# Patient Record
Sex: MALE | Race: WHITE | NOT HISPANIC OR LATINO | Employment: OTHER | URBAN - METROPOLITAN AREA
[De-identification: names, ages, dates, MRNs, and addresses within clinical notes are randomized per-mention and may not be internally consistent; named-entity substitution may affect disease eponyms.]

---

## 2018-02-26 ENCOUNTER — NEW REFERRAL (OUTPATIENT)
Dept: URBAN - METROPOLITAN AREA CLINIC 26 | Facility: CLINIC | Age: 73
End: 2018-02-26

## 2018-02-26 VITALS
DIASTOLIC BLOOD PRESSURE: 96 MMHG | HEIGHT: 69 IN | HEART RATE: 67 BPM | SYSTOLIC BLOOD PRESSURE: 158 MMHG | BODY MASS INDEX: 26.66 KG/M2 | WEIGHT: 180 LBS

## 2018-02-26 DIAGNOSIS — H33.302: ICD-10-CM

## 2018-02-26 DIAGNOSIS — H35.3122: ICD-10-CM

## 2018-02-26 DIAGNOSIS — H02.833: ICD-10-CM

## 2018-02-26 DIAGNOSIS — H43.391: ICD-10-CM

## 2018-02-26 DIAGNOSIS — H33.001: ICD-10-CM

## 2018-02-26 DIAGNOSIS — H04.123: ICD-10-CM

## 2018-02-26 DIAGNOSIS — H02.836: ICD-10-CM

## 2018-02-26 DIAGNOSIS — H43.811: ICD-10-CM

## 2018-02-26 PROCEDURE — 67110 REPAIR DETACHED RETINA: CPT

## 2018-02-26 PROCEDURE — 76512 OPH US DX B-SCAN: CPT

## 2018-02-26 PROCEDURE — 92225 OPHTHALMOSCOPY (INITIAL): CPT

## 2018-02-26 PROCEDURE — 92250 FUNDUS PHOTOGRAPHY W/I&R: CPT

## 2018-02-26 PROCEDURE — 99204 OFFICE O/P NEW MOD 45 MIN: CPT

## 2018-02-26 ASSESSMENT — VISUAL ACUITY: OS_SC: 20/30-

## 2018-02-26 ASSESSMENT — TONOMETRY
OS_IOP_MMHG: 10
OD_IOP_MMHG: 10

## 2018-02-27 ENCOUNTER — POST-OP CHECK (OUTPATIENT)
Dept: URBAN - METROPOLITAN AREA CLINIC 26 | Facility: CLINIC | Age: 73
End: 2018-02-27

## 2018-02-27 DIAGNOSIS — H02.833: ICD-10-CM

## 2018-02-27 DIAGNOSIS — H02.836: ICD-10-CM

## 2018-02-27 DIAGNOSIS — H33.302: ICD-10-CM

## 2018-02-27 DIAGNOSIS — H33.001: ICD-10-CM

## 2018-02-27 DIAGNOSIS — H35.3122: ICD-10-CM

## 2018-02-27 DIAGNOSIS — H43.391: ICD-10-CM

## 2018-02-27 DIAGNOSIS — Z98.890: ICD-10-CM

## 2018-02-27 DIAGNOSIS — H43.811: ICD-10-CM

## 2018-02-27 DIAGNOSIS — H04.123: ICD-10-CM

## 2018-02-27 PROCEDURE — 99024 POSTOP FOLLOW-UP VISIT: CPT

## 2018-02-27 PROCEDURE — 67145 PROPH RTA DTCHMNT PC: CPT

## 2018-02-27 ASSESSMENT — VISUAL ACUITY
OD_SC: 20/200
OS_SC: 20/30-2

## 2018-02-27 ASSESSMENT — TONOMETRY
OS_IOP_MMHG: 10
OD_IOP_MMHG: 06

## 2018-03-05 ENCOUNTER — POST-OP CHECK (OUTPATIENT)
Dept: URBAN - METROPOLITAN AREA CLINIC 26 | Facility: CLINIC | Age: 73
End: 2018-03-05

## 2018-03-05 DIAGNOSIS — H33.302: ICD-10-CM

## 2018-03-05 DIAGNOSIS — H02.833: ICD-10-CM

## 2018-03-05 DIAGNOSIS — Z98.890: ICD-10-CM

## 2018-03-05 DIAGNOSIS — H04.123: ICD-10-CM

## 2018-03-05 DIAGNOSIS — H33.001: ICD-10-CM

## 2018-03-05 DIAGNOSIS — H02.836: ICD-10-CM

## 2018-03-05 DIAGNOSIS — H43.391: ICD-10-CM

## 2018-03-05 DIAGNOSIS — H43.811: ICD-10-CM

## 2018-03-05 DIAGNOSIS — H35.3122: ICD-10-CM

## 2018-03-05 PROCEDURE — 99024 POSTOP FOLLOW-UP VISIT: CPT

## 2018-03-05 PROCEDURE — 67105 REPAIR DETACHED RETINA PC: CPT

## 2018-03-05 ASSESSMENT — TONOMETRY: OD_IOP_MMHG: 13

## 2018-03-05 ASSESSMENT — VISUAL ACUITY
OD_CC: 20/50-2
OD_SC: 20/80+1

## 2018-04-02 ENCOUNTER — POST OP-DILATION (OUTPATIENT)
Dept: URBAN - METROPOLITAN AREA CLINIC 26 | Facility: CLINIC | Age: 73
End: 2018-04-02

## 2018-04-02 DIAGNOSIS — H35.3211: ICD-10-CM

## 2018-04-02 DIAGNOSIS — H33.001: ICD-10-CM

## 2018-04-02 DIAGNOSIS — Z98.890: ICD-10-CM

## 2018-04-02 DIAGNOSIS — H43.811: ICD-10-CM

## 2018-04-02 DIAGNOSIS — H35.3132: ICD-10-CM

## 2018-04-02 DIAGNOSIS — H33.302: ICD-10-CM

## 2018-04-02 DIAGNOSIS — H35.373: ICD-10-CM

## 2018-04-02 PROCEDURE — 92250 FUNDUS PHOTOGRAPHY W/I&R: CPT

## 2018-04-02 PROCEDURE — 92134 CPTRZ OPH DX IMG PST SGM RTA: CPT

## 2018-04-02 PROCEDURE — 99024 POSTOP FOLLOW-UP VISIT: CPT

## 2018-04-02 PROCEDURE — 92235 FLUORESCEIN ANGRPH MLTIFRAME: CPT

## 2018-04-02 PROCEDURE — 67028 INJECTION EYE DRUG: CPT

## 2018-04-02 ASSESSMENT — TONOMETRY
OD_IOP_MMHG: 9
OS_IOP_MMHG: 10

## 2018-04-02 ASSESSMENT — VISUAL ACUITY
OD_CC: 20/20-1
OD_SC: 20/25+2
OS_CC: 20/30+1
OS_SC: 20/40+1

## 2018-04-10 ENCOUNTER — POST OP-DILATION (OUTPATIENT)
Dept: URBAN - METROPOLITAN AREA CLINIC 26 | Facility: CLINIC | Age: 73
End: 2018-04-10

## 2018-04-10 DIAGNOSIS — H33.001: ICD-10-CM

## 2018-04-10 PROCEDURE — 67105 REPAIR DETACHED RETINA PC: CPT

## 2018-04-10 ASSESSMENT — VISUAL ACUITY
OD_SC: 20/20
OD_CC: 20/15-2

## 2018-04-10 ASSESSMENT — TONOMETRY: OD_IOP_MMHG: 10

## 2018-05-08 ENCOUNTER — CLINICAL PROCEDURE AND DIAGNOSTIC TESTING ONLY (OUTPATIENT)
Dept: URBAN - METROPOLITAN AREA CLINIC 26 | Facility: CLINIC | Age: 73
End: 2018-05-08

## 2018-05-08 DIAGNOSIS — H35.3211: ICD-10-CM

## 2018-05-08 DIAGNOSIS — H35.3132: ICD-10-CM

## 2018-05-08 PROCEDURE — 92134 CPTRZ OPH DX IMG PST SGM RTA: CPT

## 2018-05-08 PROCEDURE — 67028 INJECTION EYE DRUG: CPT

## 2018-05-08 ASSESSMENT — VISUAL ACUITY
OD_CC: 20/15-1
OD_SC: 20/20-

## 2018-05-08 ASSESSMENT — TONOMETRY: OD_IOP_MMHG: 12

## 2018-06-12 ENCOUNTER — FOLLOW UP AND POST INJECTION EVALUATION (OUTPATIENT)
Dept: URBAN - METROPOLITAN AREA CLINIC 26 | Facility: CLINIC | Age: 73
End: 2018-06-12

## 2018-06-12 VITALS — HEART RATE: 77 BPM | HEIGHT: 60 IN | DIASTOLIC BLOOD PRESSURE: 86 MMHG | SYSTOLIC BLOOD PRESSURE: 146 MMHG

## 2018-06-12 DIAGNOSIS — H02.412: ICD-10-CM

## 2018-06-12 DIAGNOSIS — H33.001: ICD-10-CM

## 2018-06-12 DIAGNOSIS — H04.123: ICD-10-CM

## 2018-06-12 DIAGNOSIS — H35.3132: ICD-10-CM

## 2018-06-12 DIAGNOSIS — H35.3211: ICD-10-CM

## 2018-06-12 DIAGNOSIS — H33.302: ICD-10-CM

## 2018-06-12 DIAGNOSIS — Z98.890: ICD-10-CM

## 2018-06-12 DIAGNOSIS — H43.811: ICD-10-CM

## 2018-06-12 DIAGNOSIS — H35.373: ICD-10-CM

## 2018-06-12 DIAGNOSIS — H43.391: ICD-10-CM

## 2018-06-12 PROCEDURE — 67028 INJECTION EYE DRUG: CPT

## 2018-06-12 PROCEDURE — 92014 COMPRE OPH EXAM EST PT 1/>: CPT

## 2018-06-12 PROCEDURE — 92250 FUNDUS PHOTOGRAPHY W/I&R: CPT

## 2018-06-12 ASSESSMENT — TONOMETRY
OS_IOP_MMHG: 15
OD_IOP_MMHG: 14

## 2018-06-12 ASSESSMENT — VISUAL ACUITY
OD_CC: 20/20+2
OS_CC: 20/25+1

## 2018-07-24 ENCOUNTER — CLINICAL PROCEDURE AND DIAGNOSTIC TESTING ONLY (OUTPATIENT)
Dept: URBAN - METROPOLITAN AREA CLINIC 26 | Facility: CLINIC | Age: 73
End: 2018-07-24

## 2018-07-24 DIAGNOSIS — H35.3211: ICD-10-CM

## 2018-07-24 DIAGNOSIS — H35.3122: ICD-10-CM

## 2018-07-24 DIAGNOSIS — H33.302: ICD-10-CM

## 2018-07-24 DIAGNOSIS — H35.373: ICD-10-CM

## 2018-07-24 PROCEDURE — 92250 FUNDUS PHOTOGRAPHY W/I&R: CPT

## 2018-07-24 PROCEDURE — 67028 INJECTION EYE DRUG: CPT

## 2018-07-24 PROCEDURE — 92134 CPTRZ OPH DX IMG PST SGM RTA: CPT

## 2018-07-24 ASSESSMENT — TONOMETRY: OD_IOP_MMHG: 14

## 2018-07-24 ASSESSMENT — VISUAL ACUITY: OD_CC: 20/20-1

## 2018-09-06 ENCOUNTER — CLINICAL PROCEDURE AND DIAGNOSTIC TESTING ONLY (OUTPATIENT)
Dept: URBAN - METROPOLITAN AREA CLINIC 26 | Facility: CLINIC | Age: 73
End: 2018-09-06

## 2018-09-06 DIAGNOSIS — H35.3211: ICD-10-CM

## 2018-09-06 DIAGNOSIS — H35.3122: ICD-10-CM

## 2018-09-06 DIAGNOSIS — H35.373: ICD-10-CM

## 2018-09-06 DIAGNOSIS — H33.302: ICD-10-CM

## 2018-09-06 PROCEDURE — 67028 INJECTION EYE DRUG: CPT

## 2018-09-06 PROCEDURE — 92134 CPTRZ OPH DX IMG PST SGM RTA: CPT

## 2018-09-06 PROCEDURE — 92250 FUNDUS PHOTOGRAPHY W/I&R: CPT

## 2018-09-06 ASSESSMENT — TONOMETRY: OD_IOP_MMHG: 12

## 2018-09-06 ASSESSMENT — VISUAL ACUITY: OD_CC: 20/15-1

## 2018-10-18 ENCOUNTER — FOLLOW UP AND POST INJECTION EVALUATION (OUTPATIENT)
Dept: URBAN - METROPOLITAN AREA CLINIC 26 | Facility: CLINIC | Age: 73
End: 2018-10-18

## 2018-10-18 DIAGNOSIS — H43.811: ICD-10-CM

## 2018-10-18 DIAGNOSIS — H35.3122: ICD-10-CM

## 2018-10-18 DIAGNOSIS — H35.3211: ICD-10-CM

## 2018-10-18 DIAGNOSIS — H33.302: ICD-10-CM

## 2018-10-18 DIAGNOSIS — H35.373: ICD-10-CM

## 2018-10-18 DIAGNOSIS — H43.391: ICD-10-CM

## 2018-10-18 DIAGNOSIS — H33.001: ICD-10-CM

## 2018-10-18 PROCEDURE — 92014 COMPRE OPH EXAM EST PT 1/>: CPT

## 2018-10-18 PROCEDURE — 92134 CPTRZ OPH DX IMG PST SGM RTA: CPT

## 2018-10-18 PROCEDURE — 92250 FUNDUS PHOTOGRAPHY W/I&R: CPT

## 2018-10-18 PROCEDURE — 67028 INJECTION EYE DRUG: CPT

## 2018-10-18 ASSESSMENT — TONOMETRY
OD_IOP_MMHG: 15
OS_IOP_MMHG: 14

## 2018-10-18 ASSESSMENT — VISUAL ACUITY
OD_CC: 20/20-1
OS_CC: 20/25+2

## 2018-12-06 ENCOUNTER — CLINICAL PROCEDURE AND DIAGNOSTIC TESTING ONLY (OUTPATIENT)
Dept: URBAN - METROPOLITAN AREA CLINIC 26 | Facility: CLINIC | Age: 73
End: 2018-12-06

## 2018-12-06 DIAGNOSIS — H33.302: ICD-10-CM

## 2018-12-06 DIAGNOSIS — H35.3211: ICD-10-CM

## 2018-12-06 DIAGNOSIS — H35.3122: ICD-10-CM

## 2018-12-06 PROCEDURE — 92250 FUNDUS PHOTOGRAPHY W/I&R: CPT

## 2018-12-06 PROCEDURE — 67028 INJECTION EYE DRUG: CPT

## 2018-12-06 PROCEDURE — 92134 CPTRZ OPH DX IMG PST SGM RTA: CPT

## 2018-12-06 ASSESSMENT — VISUAL ACUITY: OD_CC: 20/20-2

## 2018-12-06 ASSESSMENT — TONOMETRY: OD_IOP_MMHG: 10

## 2019-02-06 ENCOUNTER — FOLLOW UP AND POST INJECTION EVALUATION (OUTPATIENT)
Dept: URBAN - METROPOLITAN AREA CLINIC 26 | Facility: CLINIC | Age: 74
End: 2019-02-06

## 2019-02-06 VITALS — HEIGHT: 69 IN | WEIGHT: 177 LBS | BODY MASS INDEX: 26.22 KG/M2

## 2019-02-06 DIAGNOSIS — Z98.890: ICD-10-CM

## 2019-02-06 DIAGNOSIS — H35.3122: ICD-10-CM

## 2019-02-06 DIAGNOSIS — H02.412: ICD-10-CM

## 2019-02-06 DIAGNOSIS — H33.302: ICD-10-CM

## 2019-02-06 DIAGNOSIS — H43.391: ICD-10-CM

## 2019-02-06 DIAGNOSIS — H43.811: ICD-10-CM

## 2019-02-06 DIAGNOSIS — H04.123: ICD-10-CM

## 2019-02-06 DIAGNOSIS — H02.833: ICD-10-CM

## 2019-02-06 DIAGNOSIS — H35.3211: ICD-10-CM

## 2019-02-06 DIAGNOSIS — H35.373: ICD-10-CM

## 2019-02-06 DIAGNOSIS — H02.836: ICD-10-CM

## 2019-02-06 DIAGNOSIS — H33.001: ICD-10-CM

## 2019-02-06 PROCEDURE — 92014 COMPRE OPH EXAM EST PT 1/>: CPT

## 2019-02-06 PROCEDURE — 92250 FUNDUS PHOTOGRAPHY W/I&R: CPT

## 2019-02-06 PROCEDURE — 92134 CPTRZ OPH DX IMG PST SGM RTA: CPT

## 2019-02-06 PROCEDURE — 67028 INJECTION EYE DRUG: CPT

## 2019-02-06 ASSESSMENT — VISUAL ACUITY
OD_CC: 20/15-1
OS_CC: 20/25

## 2019-02-06 ASSESSMENT — TONOMETRY
OS_IOP_MMHG: 13
OD_IOP_MMHG: 12

## 2019-04-17 ENCOUNTER — CLINICAL PROCEDURE AND DIAGNOSTIC TESTING ONLY (OUTPATIENT)
Dept: URBAN - METROPOLITAN AREA CLINIC 26 | Facility: CLINIC | Age: 74
End: 2019-04-17

## 2019-04-17 DIAGNOSIS — H35.3122: ICD-10-CM

## 2019-04-17 DIAGNOSIS — H33.302: ICD-10-CM

## 2019-04-17 DIAGNOSIS — H35.3211: ICD-10-CM

## 2019-04-17 PROCEDURE — 92250 FUNDUS PHOTOGRAPHY W/I&R: CPT

## 2019-04-17 PROCEDURE — 92134 CPTRZ OPH DX IMG PST SGM RTA: CPT

## 2019-04-17 PROCEDURE — 67028 INJECTION EYE DRUG: CPT

## 2019-04-17 ASSESSMENT — VISUAL ACUITY: OD_CC: 20/15-2

## 2019-04-17 ASSESSMENT — TONOMETRY: OD_IOP_MMHG: 14

## 2019-06-27 ENCOUNTER — CLINICAL PROCEDURE AND DIAGNOSTIC TESTING ONLY (OUTPATIENT)
Dept: URBAN - METROPOLITAN AREA CLINIC 26 | Facility: CLINIC | Age: 74
End: 2019-06-27

## 2019-06-27 DIAGNOSIS — H35.373: ICD-10-CM

## 2019-06-27 DIAGNOSIS — H35.3122: ICD-10-CM

## 2019-06-27 DIAGNOSIS — H33.302: ICD-10-CM

## 2019-06-27 DIAGNOSIS — H35.3211: ICD-10-CM

## 2019-06-27 PROCEDURE — 92250 FUNDUS PHOTOGRAPHY W/I&R: CPT

## 2019-06-27 PROCEDURE — 92134 CPTRZ OPH DX IMG PST SGM RTA: CPT

## 2019-06-27 PROCEDURE — 67028 INJECTION EYE DRUG: CPT

## 2019-06-27 ASSESSMENT — TONOMETRY: OD_IOP_MMHG: 10

## 2019-06-27 ASSESSMENT — VISUAL ACUITY: OD_SC: 20/20+2

## 2019-08-29 ENCOUNTER — FOLLOW UP AND POST INJECTION EVALUATION (OUTPATIENT)
Dept: URBAN - METROPOLITAN AREA CLINIC 26 | Facility: CLINIC | Age: 74
End: 2019-08-29

## 2019-08-29 DIAGNOSIS — H35.373: ICD-10-CM

## 2019-08-29 DIAGNOSIS — H33.001: ICD-10-CM

## 2019-08-29 DIAGNOSIS — H35.3211: ICD-10-CM

## 2019-08-29 DIAGNOSIS — H35.3122: ICD-10-CM

## 2019-08-29 DIAGNOSIS — H43.811: ICD-10-CM

## 2019-08-29 DIAGNOSIS — H43.391: ICD-10-CM

## 2019-08-29 DIAGNOSIS — H33.302: ICD-10-CM

## 2019-08-29 PROCEDURE — 92134 CPTRZ OPH DX IMG PST SGM RTA: CPT

## 2019-08-29 PROCEDURE — 92014 COMPRE OPH EXAM EST PT 1/>: CPT

## 2019-08-29 PROCEDURE — 92250 FUNDUS PHOTOGRAPHY W/I&R: CPT

## 2019-08-29 PROCEDURE — 67028 INJECTION EYE DRUG: CPT

## 2019-08-29 ASSESSMENT — TONOMETRY
OD_IOP_MMHG: 10
OS_IOP_MMHG: 9

## 2019-08-29 ASSESSMENT — VISUAL ACUITY
OD_CC: 20/15
OS_CC: 20/25

## 2019-11-07 ENCOUNTER — CLINICAL PROCEDURE AND DIAGNOSTIC TESTING ONLY (OUTPATIENT)
Dept: URBAN - METROPOLITAN AREA CLINIC 26 | Facility: CLINIC | Age: 74
End: 2019-11-07

## 2019-11-07 DIAGNOSIS — H33.302: ICD-10-CM

## 2019-11-07 DIAGNOSIS — H35.3122: ICD-10-CM

## 2019-11-07 DIAGNOSIS — H35.3211: ICD-10-CM

## 2019-11-07 PROCEDURE — 92134 CPTRZ OPH DX IMG PST SGM RTA: CPT

## 2019-11-07 PROCEDURE — 67028 INJECTION EYE DRUG: CPT

## 2019-11-07 PROCEDURE — 92250 FUNDUS PHOTOGRAPHY W/I&R: CPT

## 2019-11-07 ASSESSMENT — TONOMETRY: OD_IOP_MMHG: 09

## 2019-11-07 ASSESSMENT — VISUAL ACUITY: OD_CC: 20/20-1

## 2020-01-16 ENCOUNTER — CLINICAL PROCEDURE AND DIAGNOSTIC TESTING ONLY (OUTPATIENT)
Dept: URBAN - METROPOLITAN AREA CLINIC 26 | Facility: CLINIC | Age: 75
End: 2020-01-16

## 2020-01-16 DIAGNOSIS — H35.3211: ICD-10-CM

## 2020-01-16 DIAGNOSIS — H35.3122: ICD-10-CM

## 2020-01-16 DIAGNOSIS — H35.373: ICD-10-CM

## 2020-01-16 PROCEDURE — 67028 INJECTION EYE DRUG: CPT

## 2020-01-16 PROCEDURE — 92250 FUNDUS PHOTOGRAPHY W/I&R: CPT

## 2020-01-16 PROCEDURE — 92134 CPTRZ OPH DX IMG PST SGM RTA: CPT

## 2020-01-16 ASSESSMENT — VISUAL ACUITY: OD_SC: 20/20

## 2020-01-16 ASSESSMENT — TONOMETRY: OD_IOP_MMHG: 14

## 2020-03-26 ENCOUNTER — FOLLOW UP AND POST INJECTION EVALUATION (OUTPATIENT)
Dept: URBAN - METROPOLITAN AREA CLINIC 26 | Facility: CLINIC | Age: 75
End: 2020-03-26

## 2020-03-26 VITALS — WEIGHT: 175 LBS | HEIGHT: 68 IN | BODY MASS INDEX: 26.52 KG/M2

## 2020-03-26 DIAGNOSIS — H33.302: ICD-10-CM

## 2020-03-26 DIAGNOSIS — H35.373: ICD-10-CM

## 2020-03-26 DIAGNOSIS — H43.811: ICD-10-CM

## 2020-03-26 DIAGNOSIS — H35.3122: ICD-10-CM

## 2020-03-26 DIAGNOSIS — H33.001: ICD-10-CM

## 2020-03-26 DIAGNOSIS — H43.391: ICD-10-CM

## 2020-03-26 DIAGNOSIS — H35.3211: ICD-10-CM

## 2020-03-26 PROCEDURE — 67028 INJECTION EYE DRUG: CPT

## 2020-03-26 PROCEDURE — 92014 COMPRE OPH EXAM EST PT 1/>: CPT

## 2020-03-26 PROCEDURE — 92250 FUNDUS PHOTOGRAPHY W/I&R: CPT

## 2020-03-26 ASSESSMENT — TONOMETRY
OD_IOP_MMHG: 12
OS_IOP_MMHG: 11

## 2020-03-26 ASSESSMENT — VISUAL ACUITY
OS_CC: 20/25
OD_CC: 20/20-1

## 2020-05-21 ENCOUNTER — CLINICAL PROCEDURE AND DIAGNOSTIC TESTING ONLY (OUTPATIENT)
Dept: URBAN - METROPOLITAN AREA CLINIC 26 | Facility: CLINIC | Age: 75
End: 2020-05-21

## 2020-05-21 VITALS — SYSTOLIC BLOOD PRESSURE: 135 MMHG | HEART RATE: 74 BPM | DIASTOLIC BLOOD PRESSURE: 85 MMHG | HEIGHT: 60 IN

## 2020-05-21 DIAGNOSIS — H02.412: ICD-10-CM

## 2020-05-21 DIAGNOSIS — H43.391: ICD-10-CM

## 2020-05-21 DIAGNOSIS — H35.3122: ICD-10-CM

## 2020-05-21 DIAGNOSIS — H04.123: ICD-10-CM

## 2020-05-21 DIAGNOSIS — Z98.890: ICD-10-CM

## 2020-05-21 DIAGNOSIS — H43.811: ICD-10-CM

## 2020-05-21 DIAGNOSIS — H33.302: ICD-10-CM

## 2020-05-21 DIAGNOSIS — H02.836: ICD-10-CM

## 2020-05-21 DIAGNOSIS — H02.833: ICD-10-CM

## 2020-05-21 DIAGNOSIS — H35.373: ICD-10-CM

## 2020-05-21 DIAGNOSIS — H35.3211: ICD-10-CM

## 2020-05-21 DIAGNOSIS — H35.81: ICD-10-CM

## 2020-05-21 DIAGNOSIS — H33.001: ICD-10-CM

## 2020-05-21 PROCEDURE — 92250 FUNDUS PHOTOGRAPHY W/I&R: CPT

## 2020-05-21 PROCEDURE — 92014 COMPRE OPH EXAM EST PT 1/>: CPT

## 2020-05-21 PROCEDURE — 67028 INJECTION EYE DRUG: CPT

## 2020-05-21 PROCEDURE — 92235 FLUORESCEIN ANGRPH MLTIFRAME: CPT

## 2020-05-21 PROCEDURE — 92134 CPTRZ OPH DX IMG PST SGM RTA: CPT

## 2020-05-21 RX ORDER — KETOROLAC TROMETHAMINE 5 MG/ML: 1 SOLUTION OPHTHALMIC

## 2020-05-21 ASSESSMENT — TONOMETRY: OD_IOP_MMHG: 12

## 2020-05-21 ASSESSMENT — VISUAL ACUITY
OS_SC: 20/30-2
OD_SC: 20/25+2

## 2020-12-28 ENCOUNTER — FOLLOW UP (OUTPATIENT)
Dept: URBAN - METROPOLITAN AREA CLINIC 26 | Facility: CLINIC | Age: 75
End: 2020-12-28

## 2020-12-28 DIAGNOSIS — H35.3122: ICD-10-CM

## 2020-12-28 DIAGNOSIS — H35.3211: ICD-10-CM

## 2020-12-28 DIAGNOSIS — H33.001: ICD-10-CM

## 2020-12-28 DIAGNOSIS — H43.391: ICD-10-CM

## 2020-12-28 DIAGNOSIS — H04.123: ICD-10-CM

## 2020-12-28 DIAGNOSIS — H02.836: ICD-10-CM

## 2020-12-28 DIAGNOSIS — Z98.890: ICD-10-CM

## 2020-12-28 DIAGNOSIS — H02.412: ICD-10-CM

## 2020-12-28 DIAGNOSIS — H35.81: ICD-10-CM

## 2020-12-28 DIAGNOSIS — H33.302: ICD-10-CM

## 2020-12-28 DIAGNOSIS — H02.833: ICD-10-CM

## 2020-12-28 DIAGNOSIS — H35.373: ICD-10-CM

## 2020-12-28 DIAGNOSIS — H43.811: ICD-10-CM

## 2020-12-28 PROCEDURE — 67028 INJECTION EYE DRUG: CPT

## 2020-12-28 PROCEDURE — 92250 FUNDUS PHOTOGRAPHY W/I&R: CPT

## 2020-12-28 PROCEDURE — 92014 COMPRE OPH EXAM EST PT 1/>: CPT

## 2020-12-28 PROCEDURE — 92134 CPTRZ OPH DX IMG PST SGM RTA: CPT

## 2020-12-28 ASSESSMENT — TONOMETRY
OS_IOP_MMHG: 12
OD_IOP_MMHG: 11

## 2020-12-28 ASSESSMENT — VISUAL ACUITY
OD_CC: 20/20
OS_CC: 20/25

## 2021-02-02 ENCOUNTER — APPOINTMENT (RX ONLY)
Dept: URBAN - METROPOLITAN AREA CLINIC 116 | Facility: CLINIC | Age: 76
Setting detail: DERMATOLOGY
End: 2021-02-02

## 2021-02-02 VITALS — TEMPERATURE: 98.1 F

## 2021-02-02 DIAGNOSIS — L85.3 XEROSIS CUTIS: ICD-10-CM

## 2021-02-02 DIAGNOSIS — L81.4 OTHER MELANIN HYPERPIGMENTATION: ICD-10-CM

## 2021-02-02 DIAGNOSIS — D22 MELANOCYTIC NEVI: ICD-10-CM

## 2021-02-02 DIAGNOSIS — L82.1 OTHER SEBORRHEIC KERATOSIS: ICD-10-CM

## 2021-02-02 DIAGNOSIS — Z71.89 OTHER SPECIFIED COUNSELING: ICD-10-CM

## 2021-02-02 DIAGNOSIS — L21.8 OTHER SEBORRHEIC DERMATITIS: ICD-10-CM

## 2021-02-02 DIAGNOSIS — D18.0 HEMANGIOMA: ICD-10-CM

## 2021-02-02 DIAGNOSIS — L57.0 ACTINIC KERATOSIS: ICD-10-CM

## 2021-02-02 PROBLEM — D18.01 HEMANGIOMA OF SKIN AND SUBCUTANEOUS TISSUE: Status: ACTIVE | Noted: 2021-02-02

## 2021-02-02 PROBLEM — D22.5 MELANOCYTIC NEVI OF TRUNK: Status: ACTIVE | Noted: 2021-02-02

## 2021-02-02 PROCEDURE — 99203 OFFICE O/P NEW LOW 30 MIN: CPT | Mod: 25

## 2021-02-02 PROCEDURE — ? COUNSELING

## 2021-02-02 PROCEDURE — ? LIQUID NITROGEN

## 2021-02-02 PROCEDURE — ? PRESCRIPTION

## 2021-02-02 PROCEDURE — 17003 DESTRUCT PREMALG LES 2-14: CPT

## 2021-02-02 PROCEDURE — 17000 DESTRUCT PREMALG LESION: CPT

## 2021-02-02 RX ORDER — CICLOPIROX OLAMINE 7.7 MG/G
CREAM TOPICAL BID
Qty: 1 | Refills: 4 | Status: ERX | COMMUNITY
Start: 2021-02-02

## 2021-02-02 RX ORDER — HYDROCORTISONE 25 MG/G
CREAM TOPICAL BID
Qty: 1 | Refills: 3 | Status: ERX | COMMUNITY
Start: 2021-02-02

## 2021-02-02 RX ADMIN — HYDROCORTISONE 1: 25 CREAM TOPICAL at 00:00

## 2021-02-02 RX ADMIN — CICLOPIROX OLAMINE 1: 7.7 CREAM TOPICAL at 00:00

## 2021-02-02 ASSESSMENT — LOCATION DETAILED DESCRIPTION DERM
LOCATION DETAILED: RIGHT SUPERIOR HELIX
LOCATION DETAILED: SUPERIOR THORACIC SPINE
LOCATION DETAILED: LEFT CENTRAL MALAR CHEEK
LOCATION DETAILED: LEFT SUPERIOR HELIX
LOCATION DETAILED: RIGHT SUPERIOR LATERAL MALAR CHEEK
LOCATION DETAILED: RIGHT PROXIMAL PRETIBIAL REGION
LOCATION DETAILED: RIGHT CENTRAL MALAR CHEEK
LOCATION DETAILED: RIGHT SUPERIOR FOREHEAD
LOCATION DETAILED: LEFT PROXIMAL PRETIBIAL REGION
LOCATION DETAILED: RIGHT INFERIOR MEDIAL UPPER BACK
LOCATION DETAILED: LEFT SUPERIOR CENTRAL MALAR CHEEK
LOCATION DETAILED: EPIGASTRIC SKIN

## 2021-02-02 ASSESSMENT — LOCATION SIMPLE DESCRIPTION DERM
LOCATION SIMPLE: ABDOMEN
LOCATION SIMPLE: RIGHT CHEEK
LOCATION SIMPLE: RIGHT FOREHEAD
LOCATION SIMPLE: RIGHT PRETIBIAL REGION
LOCATION SIMPLE: LEFT PRETIBIAL REGION
LOCATION SIMPLE: LEFT CHEEK
LOCATION SIMPLE: LEFT EAR
LOCATION SIMPLE: UPPER BACK
LOCATION SIMPLE: RIGHT UPPER BACK
LOCATION SIMPLE: RIGHT EAR

## 2021-02-02 ASSESSMENT — LOCATION ZONE DERM
LOCATION ZONE: EAR
LOCATION ZONE: FACE
LOCATION ZONE: LEG
LOCATION ZONE: TRUNK

## 2021-02-02 NOTE — PROCEDURE: LIQUID NITROGEN
Render Post-Care Instructions In Note?: yes
Detail Level: Simple
Render Note In Bullet Format When Appropriate: No
Duration Of Freeze Thaw-Cycle (Seconds): 2
Number Of Freeze-Thaw Cycles: 3 freeze-thaw cycles
Consent: The patient's consent was obtained including but not limited to risks of crusting, scabbing, blistering, scarring, darker or lighter pigmentary change, recurrence, incomplete removal and infection.
Post-Care Instructions: I reviewed with the patient in detail post-care instructions. Patient is to wear sunprotection, and avoid picking at any of the treated lesions. Pt may apply Vaseline to crusted or scabbing areas.

## 2021-02-02 NOTE — PROCEDURE: COUNSELING
Sunscreen Recommendations: SPF with zinc 30- [address]
Detail Level: Simple
Detail Level: Zone
Sunscreen Recommendations: Spf 30 to 50
Detail Level: Generalized
Skin Checks Recommendations: Watch for any abnormal moles.
Shampoo Recommendations: Dandruff shampoos
Moisturizer Recommendations: Apply moisturizing creams daily to skin after showering.

## 2021-02-02 NOTE — PROCEDURE: MIPS QUALITY
Quality 226: Preventive Care And Screening: Tobacco Use: Screening And Cessation Intervention: Patient screened for tobacco use and is an ex/non-smoker
Detail Level: Simple
Quality 47: Advance Care Plan: Advance Care Planning discussed and documented; advance care plan or surrogate decision maker documented in the medical record.
Name And Contact Information For Health Care Proxy: Justino Okeefe [phone]
Quality 130: Documentation Of Current Medications In The Medical Record: Current Medications Documented
Quality 111:Pneumonia Vaccination Status For Older Adults: Pneumococcal Vaccination Previously Received

## 2021-03-08 ENCOUNTER — CLINICAL PROCEDURE AND DIAGNOSTIC TESTING ONLY (OUTPATIENT)
Dept: URBAN - METROPOLITAN AREA CLINIC 26 | Facility: CLINIC | Age: 76
End: 2021-03-08

## 2021-03-08 DIAGNOSIS — H35.373: ICD-10-CM

## 2021-03-08 DIAGNOSIS — H35.3122: ICD-10-CM

## 2021-03-08 DIAGNOSIS — H35.3211: ICD-10-CM

## 2021-03-08 PROCEDURE — 92250 FUNDUS PHOTOGRAPHY W/I&R: CPT

## 2021-03-08 PROCEDURE — 92134 CPTRZ OPH DX IMG PST SGM RTA: CPT

## 2021-03-08 PROCEDURE — 67028 INJECTION EYE DRUG: CPT

## 2021-03-08 ASSESSMENT — TONOMETRY: OD_IOP_MMHG: 12

## 2021-03-08 ASSESSMENT — VISUAL ACUITY: OD_CC: 20/20-1

## 2021-05-17 ENCOUNTER — CLINICAL PROCEDURE AND DIAGNOSTIC TESTING ONLY (OUTPATIENT)
Dept: URBAN - METROPOLITAN AREA CLINIC 26 | Facility: CLINIC | Age: 76
End: 2021-05-17

## 2021-05-17 DIAGNOSIS — H35.3211: ICD-10-CM

## 2021-05-17 DIAGNOSIS — H35.3122: ICD-10-CM

## 2021-05-17 DIAGNOSIS — H35.373: ICD-10-CM

## 2021-05-17 PROCEDURE — 92250 FUNDUS PHOTOGRAPHY W/I&R: CPT

## 2021-05-17 PROCEDURE — 92134 CPTRZ OPH DX IMG PST SGM RTA: CPT

## 2021-05-17 PROCEDURE — 67028 INJECTION EYE DRUG: CPT

## 2021-05-17 ASSESSMENT — VISUAL ACUITY: OD_CC: 20/20-2

## 2021-05-17 ASSESSMENT — TONOMETRY: OD_IOP_MMHG: 12

## 2021-12-15 ENCOUNTER — FOLLOW UP (OUTPATIENT)
Dept: URBAN - METROPOLITAN AREA CLINIC 26 | Facility: CLINIC | Age: 76
End: 2021-12-15

## 2021-12-15 DIAGNOSIS — H35.371: ICD-10-CM

## 2021-12-15 DIAGNOSIS — H35.3122: ICD-10-CM

## 2021-12-15 DIAGNOSIS — H33.302: ICD-10-CM

## 2021-12-15 DIAGNOSIS — H43.811: ICD-10-CM

## 2021-12-15 DIAGNOSIS — H35.81: ICD-10-CM

## 2021-12-15 DIAGNOSIS — H35.372: ICD-10-CM

## 2021-12-15 DIAGNOSIS — H43.391: ICD-10-CM

## 2021-12-15 DIAGNOSIS — H04.123: ICD-10-CM

## 2021-12-15 DIAGNOSIS — H35.3211: ICD-10-CM

## 2021-12-15 DIAGNOSIS — H33.001: ICD-10-CM

## 2021-12-15 PROCEDURE — 92250 FUNDUS PHOTOGRAPHY W/I&R: CPT

## 2021-12-15 PROCEDURE — 92134 CPTRZ OPH DX IMG PST SGM RTA: CPT

## 2021-12-15 PROCEDURE — 92014 COMPRE OPH EXAM EST PT 1/>: CPT

## 2021-12-15 PROCEDURE — 67028 INJECTION EYE DRUG: CPT

## 2021-12-15 ASSESSMENT — VISUAL ACUITY
OS_CC: 20/25-1
OD_CC: 20/20-1

## 2021-12-15 ASSESSMENT — TONOMETRY
OD_IOP_MMHG: 11
OS_IOP_MMHG: 12

## 2022-02-15 ENCOUNTER — APPOINTMENT (RX ONLY)
Dept: URBAN - METROPOLITAN AREA CLINIC 116 | Facility: CLINIC | Age: 77
Setting detail: DERMATOLOGY
End: 2022-02-15

## 2022-02-15 DIAGNOSIS — L57.0 ACTINIC KERATOSIS: ICD-10-CM

## 2022-02-15 DIAGNOSIS — D18.0 HEMANGIOMA: ICD-10-CM

## 2022-02-15 DIAGNOSIS — L21.8 OTHER SEBORRHEIC DERMATITIS: ICD-10-CM

## 2022-02-15 DIAGNOSIS — L81.4 OTHER MELANIN HYPERPIGMENTATION: ICD-10-CM

## 2022-02-15 DIAGNOSIS — L82.1 OTHER SEBORRHEIC KERATOSIS: ICD-10-CM

## 2022-02-15 DIAGNOSIS — D22 MELANOCYTIC NEVI: ICD-10-CM

## 2022-02-15 PROBLEM — D22.5 MELANOCYTIC NEVI OF TRUNK: Status: ACTIVE | Noted: 2022-02-15

## 2022-02-15 PROBLEM — D18.01 HEMANGIOMA OF SKIN AND SUBCUTANEOUS TISSUE: Status: ACTIVE | Noted: 2022-02-15

## 2022-02-15 PROCEDURE — ? LIQUID NITROGEN

## 2022-02-15 PROCEDURE — 17000 DESTRUCT PREMALG LESION: CPT

## 2022-02-15 PROCEDURE — ? COUNSELING

## 2022-02-15 PROCEDURE — 99213 OFFICE O/P EST LOW 20 MIN: CPT | Mod: 25

## 2022-02-15 PROCEDURE — 17003 DESTRUCT PREMALG LES 2-14: CPT

## 2022-02-15 PROCEDURE — ? TREATMENT REGIMEN

## 2022-02-15 ASSESSMENT — LOCATION SIMPLE DESCRIPTION DERM
LOCATION SIMPLE: RIGHT LOWER BACK
LOCATION SIMPLE: LEFT CHEEK
LOCATION SIMPLE: LEFT UPPER BACK
LOCATION SIMPLE: RIGHT TEMPLE
LOCATION SIMPLE: LEFT EAR
LOCATION SIMPLE: LEFT FOREHEAD
LOCATION SIMPLE: RIGHT ZYGOMA
LOCATION SIMPLE: ABDOMEN

## 2022-02-15 ASSESSMENT — LOCATION ZONE DERM
LOCATION ZONE: TRUNK
LOCATION ZONE: EAR
LOCATION ZONE: FACE

## 2022-02-15 ASSESSMENT — LOCATION DETAILED DESCRIPTION DERM
LOCATION DETAILED: LEFT INFERIOR HELIX
LOCATION DETAILED: PERIUMBILICAL SKIN
LOCATION DETAILED: LEFT CENTRAL MALAR CHEEK
LOCATION DETAILED: RIGHT CENTRAL TEMPLE
LOCATION DETAILED: LEFT RIB CAGE
LOCATION DETAILED: LEFT SUPERIOR FOREHEAD
LOCATION DETAILED: LEFT SCAPHA
LOCATION DETAILED: RIGHT SUPERIOR MEDIAL MIDBACK
LOCATION DETAILED: LEFT SUPERIOR MEDIAL UPPER BACK
LOCATION DETAILED: RIGHT LATERAL ZYGOMA
LOCATION DETAILED: LEFT SUPERIOR CENTRAL MALAR CHEEK

## 2022-02-15 NOTE — PROCEDURE: LIQUID NITROGEN
Number Of Freeze-Thaw Cycles: 3 freeze-thaw cycles
Consent: The patient's consent was obtained including but not limited to risks of crusting, scabbing, blistering, scarring, darker or lighter pigmentary change, recurrence, incomplete removal and infection.
Detail Level: Simple
Post-Care Instructions: I reviewed with the patient in detail post-care instructions. Patient is to wear sunprotection, and avoid picking at any of the treated lesions. Pt may apply Vaseline to crusted or scabbing areas.
Show Applicator Variable?: Yes
Duration Of Freeze Thaw-Cycle (Seconds): 0
Render Note In Bullet Format When Appropriate: No

## 2022-02-15 NOTE — PROCEDURE: TREATMENT REGIMEN
Detail Level: Simple
Continue Regimen: Ciclopirox cream twice daily to affected areas as needed for flare ups. \\n\\nHydrocortisone cream twice daily to affected areas as needed for flare ups.

## 2022-02-15 NOTE — PROCEDURE: MIPS QUALITY
Detail Level: Simple
Quality 111:Pneumonia Vaccination Status For Older Adults: Pneumococcal Vaccination Previously Received
Quality 402: Tobacco Use And Help With Quitting Among Adolescents: Patient screened for tobacco and never smoked
Name And Contact Information For Health Care Proxy: Geena Harrington
Quality 130: Documentation Of Current Medications In The Medical Record: Current Medications Documented
Quality 47: Advance Care Plan: Advance Care Planning discussed and documented; advance care plan or surrogate decision maker documented in the medical record.

## 2022-02-15 NOTE — PROCEDURE: COUNSELING
Detail Level: Simple
Sunscreen Recommendations: Spf 30 to 50
Sunscreen Recommendations: SPF with zinc 30- 48
Detail Level: Generalized
Shampoo Recommendations: Dandruff shampoos
Detail Level: Zone

## 2022-03-02 ENCOUNTER — FOLLOW UP (OUTPATIENT)
Dept: URBAN - METROPOLITAN AREA CLINIC 26 | Facility: CLINIC | Age: 77
End: 2022-03-02

## 2022-03-02 DIAGNOSIS — H35.3122: ICD-10-CM

## 2022-03-02 DIAGNOSIS — H35.371: ICD-10-CM

## 2022-03-02 DIAGNOSIS — H35.3211: ICD-10-CM

## 2022-03-02 PROCEDURE — 92134 CPTRZ OPH DX IMG PST SGM RTA: CPT

## 2022-03-02 PROCEDURE — 67028 INJECTION EYE DRUG: CPT

## 2022-03-02 PROCEDURE — 92250 FUNDUS PHOTOGRAPHY W/I&R: CPT

## 2022-03-02 ASSESSMENT — TONOMETRY: OD_IOP_MMHG: 12

## 2022-11-16 ENCOUNTER — FOLLOW UP (OUTPATIENT)
Dept: URBAN - METROPOLITAN AREA CLINIC 26 | Facility: CLINIC | Age: 77
End: 2022-11-16

## 2022-11-16 VITALS — WEIGHT: 170 LBS | BODY MASS INDEX: 25.76 KG/M2 | HEIGHT: 68 IN

## 2022-11-16 DIAGNOSIS — H35.3211: ICD-10-CM

## 2022-11-16 DIAGNOSIS — H43.391: ICD-10-CM

## 2022-11-16 DIAGNOSIS — H33.001: ICD-10-CM

## 2022-11-16 DIAGNOSIS — H35.3122: ICD-10-CM

## 2022-11-16 DIAGNOSIS — H33.302: ICD-10-CM

## 2022-11-16 DIAGNOSIS — H35.373: ICD-10-CM

## 2022-11-16 DIAGNOSIS — H04.123: ICD-10-CM

## 2022-11-16 DIAGNOSIS — H43.811: ICD-10-CM

## 2022-11-16 DIAGNOSIS — H35.81: ICD-10-CM

## 2022-11-16 PROCEDURE — 92250 FUNDUS PHOTOGRAPHY W/I&R: CPT

## 2022-11-16 PROCEDURE — 92134 CPTRZ OPH DX IMG PST SGM RTA: CPT

## 2022-11-16 PROCEDURE — 92014 COMPRE OPH EXAM EST PT 1/>: CPT

## 2022-11-16 PROCEDURE — 67028 INJECTION EYE DRUG: CPT

## 2022-11-16 ASSESSMENT — VISUAL ACUITY
OS_CC: 20/40
OS_PH: 20/30-1
OD_CC: 20/25

## 2022-11-16 ASSESSMENT — TONOMETRY
OS_IOP_MMHG: 10
OD_IOP_MMHG: 09

## 2023-12-11 ENCOUNTER — FOLLOW UP (OUTPATIENT)
Dept: URBAN - METROPOLITAN AREA CLINIC 26 | Facility: CLINIC | Age: 78
End: 2023-12-11

## 2023-12-11 VITALS — WEIGHT: 170 LBS | HEIGHT: 68 IN | BODY MASS INDEX: 25.76 KG/M2

## 2023-12-11 DIAGNOSIS — H35.3122: ICD-10-CM

## 2023-12-11 DIAGNOSIS — H02.833: ICD-10-CM

## 2023-12-11 DIAGNOSIS — H43.811: ICD-10-CM

## 2023-12-11 DIAGNOSIS — H35.373: ICD-10-CM

## 2023-12-11 DIAGNOSIS — Z98.890: ICD-10-CM

## 2023-12-11 DIAGNOSIS — H33.001: ICD-10-CM

## 2023-12-11 DIAGNOSIS — H04.123: ICD-10-CM

## 2023-12-11 DIAGNOSIS — H33.302: ICD-10-CM

## 2023-12-11 DIAGNOSIS — H02.412: ICD-10-CM

## 2023-12-11 DIAGNOSIS — H35.3211: ICD-10-CM

## 2023-12-11 DIAGNOSIS — H35.81: ICD-10-CM

## 2023-12-11 DIAGNOSIS — H43.391: ICD-10-CM

## 2023-12-11 DIAGNOSIS — H02.836: ICD-10-CM

## 2023-12-11 PROCEDURE — 67028 INJECTION EYE DRUG: CPT

## 2023-12-11 PROCEDURE — 92014 COMPRE OPH EXAM EST PT 1/>: CPT

## 2023-12-11 PROCEDURE — 92250 FUNDUS PHOTOGRAPHY W/I&R: CPT

## 2023-12-11 PROCEDURE — 92134 CPTRZ OPH DX IMG PST SGM RTA: CPT

## 2023-12-11 ASSESSMENT — TONOMETRY
OD_IOP_MMHG: 10
OS_IOP_MMHG: 12

## 2023-12-11 ASSESSMENT — VISUAL ACUITY
OS_CC: 20/40
OD_CC: 20/20-2

## 2025-01-06 ENCOUNTER — COMPREHENSIVE EXAM (OUTPATIENT)
Age: 80
End: 2025-01-06

## 2025-01-06 DIAGNOSIS — H33.001: ICD-10-CM

## 2025-01-06 DIAGNOSIS — H35.373: ICD-10-CM

## 2025-01-06 DIAGNOSIS — H02.833: ICD-10-CM

## 2025-01-06 DIAGNOSIS — H04.123: ICD-10-CM

## 2025-01-06 DIAGNOSIS — H02.412: ICD-10-CM

## 2025-01-06 DIAGNOSIS — Z98.890: ICD-10-CM

## 2025-01-06 DIAGNOSIS — H43.811: ICD-10-CM

## 2025-01-06 DIAGNOSIS — H33.302: ICD-10-CM

## 2025-01-06 DIAGNOSIS — H43.391: ICD-10-CM

## 2025-01-06 DIAGNOSIS — H35.3122: ICD-10-CM

## 2025-01-06 DIAGNOSIS — H35.3211: ICD-10-CM

## 2025-01-06 DIAGNOSIS — H02.836: ICD-10-CM

## 2025-01-06 DIAGNOSIS — H35.81: ICD-10-CM

## 2025-01-06 PROCEDURE — 92014 COMPRE OPH EXAM EST PT 1/>: CPT

## 2025-01-06 PROCEDURE — 92134 CPTRZ OPH DX IMG PST SGM RTA: CPT

## 2025-01-06 PROCEDURE — 92250 FUNDUS PHOTOGRAPHY W/I&R: CPT | Mod: 59
